# Patient Record
Sex: FEMALE | URBAN - METROPOLITAN AREA
[De-identification: names, ages, dates, MRNs, and addresses within clinical notes are randomized per-mention and may not be internally consistent; named-entity substitution may affect disease eponyms.]

---

## 2018-11-30 ENCOUNTER — APPOINTMENT (RX ONLY)
Dept: URBAN - METROPOLITAN AREA CLINIC 48 | Facility: CLINIC | Age: 68
Setting detail: DERMATOLOGY
End: 2018-11-30

## 2018-11-30 DIAGNOSIS — D22 MELANOCYTIC NEVI: ICD-10-CM

## 2018-11-30 DIAGNOSIS — L30.0 NUMMULAR DERMATITIS: ICD-10-CM

## 2018-11-30 DIAGNOSIS — L70.8 OTHER ACNE: ICD-10-CM

## 2018-11-30 PROBLEM — F32.9 MAJOR DEPRESSIVE DISORDER, SINGLE EPISODE, UNSPECIFIED: Status: ACTIVE | Noted: 2018-11-30

## 2018-11-30 PROBLEM — E03.9 HYPOTHYROIDISM, UNSPECIFIED: Status: ACTIVE | Noted: 2018-11-30

## 2018-11-30 PROBLEM — D22.72 MELANOCYTIC NEVI OF LEFT LOWER LIMB, INCLUDING HIP: Status: ACTIVE | Noted: 2018-11-30

## 2018-11-30 PROBLEM — M12.9 ARTHROPATHY, UNSPECIFIED: Status: ACTIVE | Noted: 2018-11-30

## 2018-11-30 PROBLEM — F41.9 ANXIETY DISORDER, UNSPECIFIED: Status: ACTIVE | Noted: 2018-11-30

## 2018-11-30 PROCEDURE — ? COUNSELING

## 2018-11-30 PROCEDURE — 99202 OFFICE O/P NEW SF 15 MIN: CPT

## 2018-11-30 PROCEDURE — ? OBSERVATION AND MEASURE

## 2018-11-30 PROCEDURE — ? IN-HOUSE DISPENSING PHARMACY

## 2018-11-30 PROCEDURE — ? TREATMENT REGIMEN

## 2018-11-30 PROCEDURE — ? PRESCRIPTION

## 2018-11-30 RX ORDER — CLOBETASOL PROPIONATE 0.5 MG/G
OINTMENT TOPICAL BID
Qty: 1 | Refills: 0 | Status: ERX | COMMUNITY
Start: 2018-11-30

## 2018-11-30 RX ADMIN — CLOBETASOL PROPIONATE: 0.5 OINTMENT TOPICAL at 00:00

## 2018-11-30 ASSESSMENT — LOCATION DETAILED DESCRIPTION DERM
LOCATION DETAILED: RIGHT MEDIAL MALAR CHEEK
LOCATION DETAILED: RIGHT DISTAL PRETIBIAL REGION
LOCATION DETAILED: LEFT DISTAL PRETIBIAL REGION

## 2018-11-30 ASSESSMENT — LOCATION SIMPLE DESCRIPTION DERM
LOCATION SIMPLE: RIGHT PRETIBIAL REGION
LOCATION SIMPLE: LEFT PRETIBIAL REGION
LOCATION SIMPLE: RIGHT CHEEK

## 2018-11-30 ASSESSMENT — SEVERITY ASSESSMENT: SEVERITY: MODERATE

## 2018-11-30 ASSESSMENT — LOCATION ZONE DERM
LOCATION ZONE: FACE
LOCATION ZONE: LEG

## 2018-11-30 NOTE — PROCEDURE: TREATMENT REGIMEN
Detail Level: Zone
Plan: The patient was instructed to trial use of clobetasol ointment BID to affected areas for 2 weeks. She will f/u in 3 weeks for recheck. Patient advised on importance of not scratching this area as this will contribute to further itching. Patient requests in-house prescription today.

## 2018-11-30 NOTE — PROCEDURE: IN-HOUSE DISPENSING PHARMACY
Product 64 Price/Unit (In Dollars): 0
Product 9 Refills: 3
Name Of Product 1: [id number]- Clind/Tret Combo Cream\\nClindamycin Phosphate 1%/ Niacinamide 2% / Tretinoin 0.025%
Product 2 Price/Unit (In Dollars): 40.00
Product 5 Price/Unit (In Dollars): 50.00
Product 5 Application Directions: Apply to affected area in the evening after moisturizer.  Avoid use on eyelids.
Product 79 Unit Type: mg
Product 7 Application Directions: Apply to affected area one time a day in the evening.
Product 8 Application Directions: Apply to as directed
Name Of Product 10: [id number] - Tacro 0.1% ointment\\nTacrolimus 0.1% / Niacinamide 4%
Product 11 Application Directions: Apply to affected areas once to twice daily
Product 9 Dai/Unit (In Dollars): 45.00
Product 4 Application Directions: Apply topically to acne prone areas once daily.
Name Of Product 12: [id number]- Hydroquin 6% Combo Cream\\nHydroquinone 6%/ Tretinoin 0.05% / Traimcinolone Acetonide 0.025%
Product 10 Amount/Unit (Numbers Only): 30
Product 8 Unit Type: ml
Detail Level: Zone
Render Refills If Set To 0: Yes
Product 1 Amount/Unit (Numbers Only): 30
Product 2 Refills: 11
Product 8 Refills: 11
Name Of Product 3: [id number]  - Anti-Aging Tretinoin 0.025%\\nHyaluronic acid sodium salt 0.5%/ Niacinamide 4%/ Tretinoin 0.025%
Name Of Product 9: [id number] - Clob 0.05% ointment \\nClobetasol Proprionate 0.05% / Niacinamide 4%
Product 12 Refills: 2
Product 10 Application Directions: Apply to affected areas twice daily as directed
Name Of Product 11: [id number] - Ivermec 1% / Met 1% Gel (Rosacea Triple Gel)\\nIvermectin 1% / Metronidazole 1% / Niacinamide 4% / Potassium Azeloyl Diglycinate
Name Of Product 2: [id number] - Tret 0.05% Cream\\nNiacinamide 2% / Tretinoin 0.025%
Name Of Product 6: [id number] - Moise/Clind Combo Gel\\nBenzoyl Peroxide 5% / Clindamycin Phosphate 1% / Niacinamide 4%
Product 9 Units Dispensed: 1
Product 9 Amount/Unit (Numbers Only): 60
Name Of Product 4: [id number]- Acne Gel w/Dapsone\\nDapsone 7.5%/ Niacinamide 2%
Name Of Product 13: [id number] - Azelaic Acid\\nAzelaic Acid 15% / Niacinamide 4%
Name Of Product 5: [id number] - Taza 0.1% Cream\\nNiacinamide 2% / Tazarotene 0.1%
Name Of Product 7: [id number] - Adap Combo Cream\\nAdapalene 0.3% / Benzoyl Peroxide 2.5% / Niacinamide 2%
Product 3 Application Directions: Apply to affected area in the evening after moisturizer.  Avoid use on eyelids.
Product 12 Application Directions: Apply to affected areas once daily. Take 1 month off after every 2 months of use.
Product 6 Application Directions: Apply topically to acne prone areas once daily in the morning.
Name Of Product 8: [id number] - Imiqui 5% / Levo 1% Gel\\nImiquimod 5% / Levocetirizine 1% / Niacinamide 2%
Product 9 Application Directions: Apply topically to affected areas twice daily for up to 2 weeks or as directed
Product 1 Application Directions: apply topically to acne prone areas once nightly at bedtime

## 2018-12-21 ENCOUNTER — APPOINTMENT (RX ONLY)
Dept: URBAN - METROPOLITAN AREA CLINIC 48 | Facility: CLINIC | Age: 68
Setting detail: DERMATOLOGY
End: 2018-12-21

## 2018-12-21 DIAGNOSIS — L30.0 NUMMULAR DERMATITIS: ICD-10-CM | Status: RESOLVING

## 2018-12-21 PROCEDURE — ? COUNSELING

## 2018-12-21 PROCEDURE — 99212 OFFICE O/P EST SF 10 MIN: CPT

## 2018-12-21 PROCEDURE — ? TREATMENT REGIMEN

## 2018-12-21 ASSESSMENT — LOCATION SIMPLE DESCRIPTION DERM
LOCATION SIMPLE: RIGHT PRETIBIAL REGION
LOCATION SIMPLE: LEFT PRETIBIAL REGION

## 2018-12-21 ASSESSMENT — LOCATION DETAILED DESCRIPTION DERM
LOCATION DETAILED: RIGHT DISTAL PRETIBIAL REGION
LOCATION DETAILED: LEFT DISTAL PRETIBIAL REGION

## 2018-12-21 ASSESSMENT — LOCATION ZONE DERM: LOCATION ZONE: LEG

## 2018-12-21 NOTE — PROCEDURE: TREATMENT REGIMEN
Detail Level: Zone
Plan: Patient to continue clobetesol ointment BID until redness is clear. Continue cerave cream for maintenance.